# Patient Record
Sex: FEMALE
[De-identification: names, ages, dates, MRNs, and addresses within clinical notes are randomized per-mention and may not be internally consistent; named-entity substitution may affect disease eponyms.]

---

## 2020-07-29 PROBLEM — Z00.00 ENCOUNTER FOR PREVENTIVE HEALTH EXAMINATION: Status: ACTIVE | Noted: 2020-07-29

## 2020-07-31 ENCOUNTER — APPOINTMENT (OUTPATIENT)
Dept: FAMILY MEDICINE | Facility: CLINIC | Age: 20
End: 2020-07-31

## 2023-02-25 ENCOUNTER — APPOINTMENT (OUTPATIENT)
Dept: FAMILY MEDICINE | Facility: CLINIC | Age: 23
End: 2023-02-25
Payer: COMMERCIAL

## 2023-02-25 DIAGNOSIS — U07.1 COVID-19: ICD-10-CM

## 2023-02-25 PROCEDURE — 99213 OFFICE O/P EST LOW 20 MIN: CPT | Mod: 95

## 2023-03-07 NOTE — PLAN
[FreeTextEntry1] : Drink plenty of fluids. \par May take Advil/Tylenol as needed for pain/fever. \par F/u if no improvement noted. \par \par Advised on criteria for return to work after COVID-19 infection: \par 1. At least 5 days since onset of symptoms. \par 2. May end isolation at day 5 if substantial improvement noted and mostly resolved symptoms.\par 3. Fever-free for 24 hours (without the use of antipyretics).\par Referred to CDC guidelines as well.\par

## 2023-03-07 NOTE — HISTORY OF PRESENT ILLNESS
[Home] : at home, [unfilled] , at the time of the visit. [Medical Office: ([facility])___] : at the medical office located in  [Verbal consent obtained from patient] : the patient, [unfilled] [FreeTextEntry8] : WALKER MAYERS is a 22 year old female presenting with COVID.  Denies fever chills or shortness of breath COVID vaccinated symptoms are mild and are improving.